# Patient Record
Sex: MALE | Race: OTHER | Employment: FULL TIME | URBAN - METROPOLITAN AREA
[De-identification: names, ages, dates, MRNs, and addresses within clinical notes are randomized per-mention and may not be internally consistent; named-entity substitution may affect disease eponyms.]

---

## 2023-03-10 PROBLEM — R74.8 ELEVATED LIVER ENZYMES: Status: ACTIVE | Noted: 2023-03-10

## 2023-03-11 PROBLEM — E87.3 METABOLIC ALKALOSIS: Status: ACTIVE | Noted: 2023-03-11

## 2023-03-11 PROBLEM — K85.90 ACUTE PANCREATITIS, UNSPECIFIED COMPLICATION STATUS, UNSPECIFIED PANCREATITIS TYPE (HCC): Status: ACTIVE | Noted: 2023-03-11

## 2023-03-11 PROBLEM — D72.829 LEUKOCYTOSIS, UNSPECIFIED TYPE: Status: ACTIVE | Noted: 2023-03-11

## 2023-03-11 PROBLEM — K85.90 ACUTE PANCREATITIS, UNSPECIFIED COMPLICATION STATUS, UNSPECIFIED PANCREATITIS TYPE: Status: ACTIVE | Noted: 2023-03-11

## 2023-03-11 NOTE — PLAN OF CARE
NURSING DISCHARGE NOTE    Discharged Home via Wheelchair. Accompanied by Family member  Belongings Taken by patient/family. Tolerated diet s/p lap rell. Lap sites x4 approximated. Scant amt blood from umbilical incision when pt was up to the bathroom. Gauze/tape applied, but incision otherwise WNL. Prescription for Norco given to pt's wife.

## 2023-03-11 NOTE — PLAN OF CARE
Assumed care of this pt upon admission. Pt alert oriented on room air, vss, pt up ad delores. At this time pt denies any pain, oriented to room and plan of care.  No questions at this time, will continue to follow  Problem: PAIN - ADULT  Goal: Verbalizes/displays adequate comfort level or patient's stated pain goal  Description: INTERVENTIONS:  - Encourage pt to monitor pain and request assistance  - Assess pain using appropriate pain scale  - Administer analgesics based on type and severity of pain and evaluate response  - Implement non-pharmacological measures as appropriate and evaluate response  - Consider cultural and social influences on pain and pain management  - Manage/alleviate anxiety  - Utilize distraction and/or relaxation techniques  - Monitor for opioid side effects  - Notify MD/LIP if interventions unsuccessful or patient reports new pain  - Anticipate increased pain with activity and pre-medicate as appropriate  Outcome: Progressing     Problem: GASTROINTESTINAL - ADULT  Goal: Minimal or absence of nausea and vomiting  Description: INTERVENTIONS:  - Maintain adequate hydration with IV or PO as ordered and tolerated  - Nasogastric tube to low intermittent suction as ordered  - Evaluate effectiveness of ordered antiemetic medications  - Provide nonpharmacologic comfort measures as appropriate  - Advance diet as tolerated, if ordered  - Obtain nutritional consult as needed  - Evaluate fluid balance  Outcome: Progressing

## 2023-03-11 NOTE — OCCUPATIONAL THERAPY NOTE
Orders received charts reviewed. Pt currently off the floor for lap rell. OT will follow up as able and appropriate.

## 2023-03-11 NOTE — ANESTHESIA PROCEDURE NOTES
Airway  Date/Time: 3/11/2023 12:56 PM  Urgency: elective      General Information and Staff    Patient location during procedure: OR  Anesthesiologist: Ale Menjivar MD  Performed: anesthesiologist   Performed by: Ale Menjivar MD  Authorized by: Ale Menjivar MD      Indications and Patient Condition  Indications for airway management: anesthesia  Spontaneous Ventilation: absent  Sedation level: deep  Preoxygenated: yes  Patient position: sniffing  Mask difficulty assessment: 1 - vent by mask    Final Airway Details  Final airway type: endotracheal airway      Successful airway: ETT  Cuffed: yes   Successful intubation technique: Video laryngoscopy  Endotracheal tube insertion site: oral  Blade: GlideScope  Blade size: #3  ETT size (mm): 7.0    Cormack-Lehane Classification: grade I - full view of glottis  Placement verified by: chest auscultation and capnometry   Cuff volume (mL): 6  Measured from: lips  ETT to lips (cm): 22  Number of attempts at approach: 1    Additional Comments  VC clean, passed smoothly, atraumatically x1 attempt. OGT and soft bite block to molars.

## 2023-03-11 NOTE — PHYSICAL THERAPY NOTE
PT order received and chart reviewed. Spoke with RN. Pt currently in pre-op holding for lap rell. Plan to reattempt on later date as appropriate and schedule allows.

## 2023-03-11 NOTE — OPERATIVE REPORT
[facility]  Op Note    Leslie Garcia Location: OR   [facility] [id number] MRN [id number]   Admission Date 3/10/2023 Operation Date 3/11/2023   Attending Physician Stacie Gaitan MD Operating Physician Ayana Thorpe MD   DATE OF OPERATION:  3/11/2023   PREOPERATIVE DIAGNOSIS: Acute cholecystitis with cholelithiasis  POSTOPERATIVE DIAGNOSIS: Acute cholecystitis with cholelithiasis  PROCEDURE PERFORMED: Laparoscopic cholecystectomy with intraoperative cholangiogram.   SURGEON:  Ayana Thorpe MD  ASSISTANT: Marguerite Vargas PA-C (Her assistance was essential to the performance and conduct of this case, especially in the performance of the cholangiogram and the careful dissection needed in and around the triangle of Calot and gallbladder hilum.)  ANESTHESIA: General.   SPECIMEN: Gallbladder to pathology. BLOOD LOSS:  5 cc   COMPLICATIONS: None. INDICATIONS FOR PROCEDURE: The patient is a 40-year-old gentleman who presented to the emergency room with epigastric and right upper quadrant abdominal pain that radiated throughout his abdomen. CT scan revealed gallstones. He was offered laparoscopic cholecystectomy the intraoperative cholangiogram.  The risks, benefits, and alternatives were discussed in detail with the patient. Risks include but are not limited to bleeding, wound infection, right shoulder pain, injury to common bile duct, injury to the liver and injury to other intraabdominal contents. The patient was agreeable to proceed with the operation. FINDINGS: Cholangiography was normal, with good flow in the duodenum and no filling defects toward the liver. The gallbladder was somewhat inflamed. There was a large stone impacted within the neck. OPERATIVE TECHNIQUE: After informed consent was obtained, the patient was taken to the operating room and placed in the supine position. General anesthesia was induced. The abdomen was then prepped and draped in the usual sterile fashion.  The umbilicus was inverted, and a curvilinear incision was made superior to it with an 11-blade scalpel. The Veress needle was passed into the abdomen, and pneumoperitoneum was instituted without difficulty. The 11 mm trocar was passed through this incision site. The abdomen was inspected and found to be grossly normal. Under direct vision, a 5 mm subxiphoid port and two right-sided lateral 5 mm ports were placed. The patient was placed head-up, right side up. The gallbladder was grasped and retracted cranially. Dissection commenced around the cystic duct and cystic artery, which were isolated. Dissection was kept above the line of Rouviere. The critical view was obtained, demonstrating 2 structures directly entering the gallbladder. One clip was placed towards the gallbladder on the cystic duct, and the duct was partially transected. The cystic duct was cannulated. A cholangiogram was obtained showing good flow in the duodenum with no filling defects and good flow towards the liver. The cholangiocath was withdrawn, and three clips were placed opposite the partial transection. The duct was then completely transected. The cystic artery was isolated, one clip was placed towards the gallbladder, two away, and the artery was transected. The gallbladder was then dissected free from the liver bed using cautery. The gallbladder was then withdrawn through the umbilical port site. Inspection of the liver bed revealed some minor bleeding that was easily controlled with cautery. The abdomen was copiously irrigated with normal saline. Once hemostasis was assured and the irrigant returned as clear and colorless, the right-sided ports were withdrawn under direct vision. The pneumoperitoneum was aspirated, and the remaining ports were withdrawn. The fascia at the umbilical port site was reapproximated with Maxon suture, and the skin at all four incisions was reapproximated with subcuticular Vicryl suture.  0.5% Marcaine with epinephrine was injected around the incisions to help with postoperative pain control. Steri-Strips and Mastisol were placed across the incisions. The patient tolerated the procedure well, was extubated in the OR, and went to the PACU in good condition.     Napoleon Hernandez MD

## 2023-03-11 NOTE — ED INITIAL ASSESSMENT (HPI)
Pt started having lower abdominal pain that wraps around his side into his lower R back. It has lessened in intensity and sharpness but is still there. RUQ and LUQ discomfort as well. Seen in UC sent here.

## 2023-04-27 ENCOUNTER — LAB ENCOUNTER (OUTPATIENT)
Dept: LAB | Age: 48
End: 2023-04-27
Attending: FAMILY MEDICINE
Payer: COMMERCIAL

## 2023-04-27 DIAGNOSIS — Z00.00 ROUTINE GENERAL MEDICAL EXAMINATION AT A HEALTH CARE FACILITY: Primary | ICD-10-CM

## 2023-04-27 LAB
ALBUMIN SERPL-MCNC: 3.9 G/DL (ref 3.4–5)
ALBUMIN/GLOB SERPL: 1 {RATIO} (ref 1–2)
ALP LIVER SERPL-CCNC: 93 U/L
ALT SERPL-CCNC: 46 U/L
ANION GAP SERPL CALC-SCNC: 6 MMOL/L (ref 0–18)
AST SERPL-CCNC: 24 U/L (ref 15–37)
BASOPHILS # BLD AUTO: 0.07 X10(3) UL (ref 0–0.2)
BASOPHILS NFR BLD AUTO: 1 %
BILIRUB SERPL-MCNC: 1.7 MG/DL (ref 0.1–2)
BILIRUB UR QL STRIP.AUTO: NEGATIVE
BUN BLD-MCNC: 11 MG/DL (ref 7–18)
CALCIUM BLD-MCNC: 9.2 MG/DL (ref 8.5–10.1)
CHLORIDE SERPL-SCNC: 105 MMOL/L (ref 98–112)
CHOLEST SERPL-MCNC: 202 MG/DL (ref ?–200)
CLARITY UR REFRACT.AUTO: CLEAR
CO2 SERPL-SCNC: 28 MMOL/L (ref 21–32)
COLOR UR AUTO: YELLOW
COMPLEXED PSA SERPL-MCNC: 0.65 NG/ML (ref ?–4)
CREAT BLD-MCNC: 1.08 MG/DL
EOSINOPHIL # BLD AUTO: 0.1 X10(3) UL (ref 0–0.7)
EOSINOPHIL NFR BLD AUTO: 1.4 %
ERYTHROCYTE [DISTWIDTH] IN BLOOD BY AUTOMATED COUNT: 11.9 %
EST. AVERAGE GLUCOSE BLD GHB EST-MCNC: 114 MG/DL (ref 68–126)
FASTING PATIENT LIPID ANSWER: NO
FASTING STATUS PATIENT QL REPORTED: NO
GFR SERPLBLD BASED ON 1.73 SQ M-ARVRAT: 84 ML/MIN/1.73M2 (ref 60–?)
GLOBULIN PLAS-MCNC: 3.8 G/DL (ref 2.8–4.4)
GLUCOSE BLD-MCNC: 152 MG/DL (ref 70–99)
GLUCOSE UR STRIP.AUTO-MCNC: NEGATIVE MG/DL
HBA1C MFR BLD: 5.6 % (ref ?–5.7)
HCT VFR BLD AUTO: 42.8 %
HCV AB SERPL QL IA: NONREACTIVE
HDLC SERPL-MCNC: 42 MG/DL (ref 40–59)
HGB BLD-MCNC: 14.6 G/DL
IMM GRANULOCYTES # BLD AUTO: 0.02 X10(3) UL (ref 0–1)
IMM GRANULOCYTES NFR BLD: 0.3 %
KETONES UR STRIP.AUTO-MCNC: NEGATIVE MG/DL
LDLC SERPL CALC-MCNC: 125 MG/DL (ref ?–100)
LEUKOCYTE ESTERASE UR QL STRIP.AUTO: NEGATIVE
LYMPHOCYTES # BLD AUTO: 1.85 X10(3) UL (ref 1–4)
LYMPHOCYTES NFR BLD AUTO: 25.3 %
MCH RBC QN AUTO: 31.5 PG (ref 26–34)
MCHC RBC AUTO-ENTMCNC: 34.1 G/DL (ref 31–37)
MCV RBC AUTO: 92.4 FL
MONOCYTES # BLD AUTO: 0.42 X10(3) UL (ref 0.1–1)
MONOCYTES NFR BLD AUTO: 5.7 %
NEUTROPHILS # BLD AUTO: 4.86 X10 (3) UL (ref 1.5–7.7)
NEUTROPHILS # BLD AUTO: 4.86 X10(3) UL (ref 1.5–7.7)
NEUTROPHILS NFR BLD AUTO: 66.3 %
NITRITE UR QL STRIP.AUTO: NEGATIVE
NONHDLC SERPL-MCNC: 160 MG/DL (ref ?–130)
OSMOLALITY SERPL CALC.SUM OF ELEC: 290 MOSM/KG (ref 275–295)
PH UR STRIP.AUTO: 5 [PH] (ref 5–8)
PLATELET # BLD AUTO: 259 10(3)UL (ref 150–450)
POTASSIUM SERPL-SCNC: 3.9 MMOL/L (ref 3.5–5.1)
PROT SERPL-MCNC: 7.7 G/DL (ref 6.4–8.2)
PROT UR STRIP.AUTO-MCNC: NEGATIVE MG/DL
RBC # BLD AUTO: 4.63 X10(6)UL
SODIUM SERPL-SCNC: 139 MMOL/L (ref 136–145)
SP GR UR STRIP.AUTO: 1.03 (ref 1–1.03)
TRIGL SERPL-MCNC: 199 MG/DL (ref 30–149)
TSI SER-ACNC: 0.79 MIU/ML (ref 0.36–3.74)
UROBILINOGEN UR STRIP.AUTO-MCNC: <2 MG/DL
VLDLC SERPL CALC-MCNC: 36 MG/DL (ref 0–30)
WBC # BLD AUTO: 7.3 X10(3) UL (ref 4–11)

## 2023-04-27 PROCEDURE — 86803 HEPATITIS C AB TEST: CPT

## 2023-04-27 PROCEDURE — 86702 HIV-2 ANTIBODY: CPT

## 2023-04-27 PROCEDURE — 80061 LIPID PANEL: CPT

## 2023-04-27 PROCEDURE — 85025 COMPLETE CBC W/AUTO DIFF WBC: CPT

## 2023-04-27 PROCEDURE — 84443 ASSAY THYROID STIM HORMONE: CPT

## 2023-04-27 PROCEDURE — 86701 HIV-1ANTIBODY: CPT

## 2023-04-27 PROCEDURE — 83036 HEMOGLOBIN GLYCOSYLATED A1C: CPT

## 2023-04-27 PROCEDURE — 80053 COMPREHEN METABOLIC PANEL: CPT

## 2023-04-27 PROCEDURE — 81001 URINALYSIS AUTO W/SCOPE: CPT

## 2023-04-27 PROCEDURE — 36415 COLL VENOUS BLD VENIPUNCTURE: CPT

## 2023-05-03 LAB
HIV 1/2 INTERP: NEGATIVE
HIV-1 AB: NON REACTIVE
HIV-2 AB: NON REACTIVE

## 2024-02-13 ENCOUNTER — OFFICE VISIT (OUTPATIENT)
Dept: FAMILY MEDICINE CLINIC | Facility: CLINIC | Age: 49
End: 2024-02-13
Payer: COMMERCIAL

## 2024-02-13 VITALS
OXYGEN SATURATION: 95 % | DIASTOLIC BLOOD PRESSURE: 78 MMHG | HEIGHT: 71 IN | BODY MASS INDEX: 28 KG/M2 | HEART RATE: 68 BPM | WEIGHT: 200 LBS | SYSTOLIC BLOOD PRESSURE: 112 MMHG

## 2024-02-13 DIAGNOSIS — Z00.00 LABORATORY EXAMINATION ORDERED AS PART OF A COMPLETE PHYSICAL EXAMINATION: ICD-10-CM

## 2024-02-13 DIAGNOSIS — E55.9 VITAMIN D DEFICIENCY: ICD-10-CM

## 2024-02-13 DIAGNOSIS — R10.30 LOWER ABDOMINAL PAIN: Primary | ICD-10-CM

## 2024-02-13 PROCEDURE — 99204 OFFICE O/P NEW MOD 45 MIN: CPT | Performed by: FAMILY MEDICINE

## 2024-02-13 PROCEDURE — 3008F BODY MASS INDEX DOCD: CPT | Performed by: FAMILY MEDICINE

## 2024-02-13 PROCEDURE — 3074F SYST BP LT 130 MM HG: CPT | Performed by: FAMILY MEDICINE

## 2024-02-13 PROCEDURE — 3078F DIAST BP <80 MM HG: CPT | Performed by: FAMILY MEDICINE

## 2024-02-21 ENCOUNTER — LAB ENCOUNTER (OUTPATIENT)
Dept: LAB | Age: 49
End: 2024-02-21
Attending: FAMILY MEDICINE
Payer: COMMERCIAL

## 2024-02-21 DIAGNOSIS — R73.09 ELEVATED GLUCOSE: ICD-10-CM

## 2024-02-21 DIAGNOSIS — Z00.00 LABORATORY EXAMINATION ORDERED AS PART OF A COMPLETE PHYSICAL EXAMINATION: ICD-10-CM

## 2024-02-21 DIAGNOSIS — E55.9 VITAMIN D DEFICIENCY: ICD-10-CM

## 2024-02-21 DIAGNOSIS — R10.30 LOWER ABDOMINAL PAIN: ICD-10-CM

## 2024-02-21 LAB
ALBUMIN SERPL-MCNC: 3.8 G/DL (ref 3.4–5)
ALBUMIN/GLOB SERPL: 1.1 {RATIO} (ref 1–2)
ALP LIVER SERPL-CCNC: 98 U/L
ALT SERPL-CCNC: 44 U/L
ANION GAP SERPL CALC-SCNC: 6 MMOL/L (ref 0–18)
AST SERPL-CCNC: 27 U/L (ref 15–37)
BASOPHILS # BLD AUTO: 0.04 X10(3) UL (ref 0–0.2)
BASOPHILS NFR BLD AUTO: 0.5 %
BILIRUB SERPL-MCNC: 1.2 MG/DL (ref 0.1–2)
BUN BLD-MCNC: 12 MG/DL (ref 9–23)
CALCIUM BLD-MCNC: 9.3 MG/DL (ref 8.5–10.1)
CHLORIDE SERPL-SCNC: 106 MMOL/L (ref 98–112)
CHOLEST SERPL-MCNC: 213 MG/DL (ref ?–200)
CO2 SERPL-SCNC: 27 MMOL/L (ref 21–32)
COMPLEXED PSA SERPL-MCNC: 0.58 NG/ML (ref ?–4)
CREAT BLD-MCNC: 0.97 MG/DL
EGFRCR SERPLBLD CKD-EPI 2021: 95 ML/MIN/1.73M2 (ref 60–?)
EOSINOPHIL # BLD AUTO: 0.09 X10(3) UL (ref 0–0.7)
EOSINOPHIL NFR BLD AUTO: 1.1 %
ERYTHROCYTE [DISTWIDTH] IN BLOOD BY AUTOMATED COUNT: 11.9 %
FASTING PATIENT LIPID ANSWER: YES
FASTING STATUS PATIENT QL REPORTED: YES
GLOBULIN PLAS-MCNC: 3.5 G/DL (ref 2.8–4.4)
GLUCOSE BLD-MCNC: 101 MG/DL (ref 70–99)
HCT VFR BLD AUTO: 44 %
HDLC SERPL-MCNC: 39 MG/DL (ref 40–59)
HGB BLD-MCNC: 15 G/DL
IMM GRANULOCYTES # BLD AUTO: 0.02 X10(3) UL (ref 0–1)
IMM GRANULOCYTES NFR BLD: 0.2 %
LDLC SERPL CALC-MCNC: 115 MG/DL (ref ?–100)
LIPASE SERPL-CCNC: 47 U/L (ref ?–300)
LYMPHOCYTES # BLD AUTO: 1.24 X10(3) UL (ref 1–4)
LYMPHOCYTES NFR BLD AUTO: 14.9 %
MCH RBC QN AUTO: 31.7 PG (ref 26–34)
MCHC RBC AUTO-ENTMCNC: 34.1 G/DL (ref 31–37)
MCV RBC AUTO: 93 FL
MONOCYTES # BLD AUTO: 0.66 X10(3) UL (ref 0.1–1)
MONOCYTES NFR BLD AUTO: 7.9 %
NEUTROPHILS # BLD AUTO: 6.3 X10 (3) UL (ref 1.5–7.7)
NEUTROPHILS # BLD AUTO: 6.3 X10(3) UL (ref 1.5–7.7)
NEUTROPHILS NFR BLD AUTO: 75.4 %
NONHDLC SERPL-MCNC: 174 MG/DL (ref ?–130)
OSMOLALITY SERPL CALC.SUM OF ELEC: 288 MOSM/KG (ref 275–295)
PLATELET # BLD AUTO: 230 10(3)UL (ref 150–450)
POTASSIUM SERPL-SCNC: 4.1 MMOL/L (ref 3.5–5.1)
PROT SERPL-MCNC: 7.3 G/DL (ref 6.4–8.2)
RBC # BLD AUTO: 4.73 X10(6)UL
SODIUM SERPL-SCNC: 139 MMOL/L (ref 136–145)
TRIGL SERPL-MCNC: 340 MG/DL (ref 30–149)
TSI SER-ACNC: 1.22 MIU/ML (ref 0.36–3.74)
VIT D+METAB SERPL-MCNC: 29.9 NG/ML (ref 30–100)
VLDLC SERPL CALC-MCNC: 60 MG/DL (ref 0–30)
WBC # BLD AUTO: 8.4 X10(3) UL (ref 4–11)

## 2024-02-21 PROCEDURE — 84153 ASSAY OF PSA TOTAL: CPT | Performed by: FAMILY MEDICINE

## 2024-02-21 PROCEDURE — 83690 ASSAY OF LIPASE: CPT | Performed by: FAMILY MEDICINE

## 2024-02-21 PROCEDURE — 82306 VITAMIN D 25 HYDROXY: CPT | Performed by: FAMILY MEDICINE

## 2024-02-21 PROCEDURE — 80050 GENERAL HEALTH PANEL: CPT | Performed by: FAMILY MEDICINE

## 2024-02-21 PROCEDURE — 80061 LIPID PANEL: CPT | Performed by: FAMILY MEDICINE

## 2024-02-21 PROCEDURE — 83036 HEMOGLOBIN GLYCOSYLATED A1C: CPT | Performed by: FAMILY MEDICINE

## 2024-02-22 LAB
EST. AVERAGE GLUCOSE BLD GHB EST-MCNC: 123 MG/DL (ref 68–126)
HBA1C MFR BLD: 5.9 % (ref ?–5.7)

## 2024-02-22 NOTE — PROGRESS NOTES
Subjective:   Patient ID: Cleveland Mcmullen is a 48 year old male.    1. Lower abdominal pain.  Occasional nausea.  Ongoing for a few months.  No F/C.  No diarrhea/constipation.  No black or bloody stools.    + vit D def.  Not taking vit D.  No fractures.  Mild fatigue.            History/Other:   Review of Systems   All other systems reviewed and are negative.    No current outpatient medications on file.     Allergies:No Known Allergies    Objective:   Physical Exam  Vitals reviewed.   Constitutional:       General: He is not in acute distress.     Appearance: He is well-developed. He is not diaphoretic.   Eyes:      General: No scleral icterus.        Right eye: No discharge.         Left eye: No discharge.      Conjunctiva/sclera: Conjunctivae normal.   Cardiovascular:      Rate and Rhythm: Normal rate and regular rhythm.      Heart sounds: Normal heart sounds.   Pulmonary:      Effort: Pulmonary effort is normal. No respiratory distress.      Breath sounds: Normal breath sounds. No wheezing or rales.   Abdominal:      Comments: + lower abd tenderness.       Assessment & Plan:   1. Lower abdominal pain    2. Laboratory examination ordered as part of a complete physical examination    3. Vitamin D deficiency      1. Lower abdominal pain  - Lipase [E]; Future  - CT ABDOMEN+PELVIS(CONTRAST ONLY)(CPT=74177); Future    2. Laboratory examination ordered as part of a complete physical examination  - Comp Metabolic Panel (14) [E]; Future  - Lipid Panel [E]; Future  - CBC W Differential W Platelet [E]; Future  - TSH W Reflex To Free T4 [E]; Future  - PSA, Total (Screening) [E]; Future  - Vitamin D [E]; Future    3. Vitamin D deficiency  - Vitamin D [E]; Future      Orders Placed This Encounter   Procedures    Comp Metabolic Panel (14) [E]    Lipase [E]    Lipid Panel [E]    CBC W Differential W Platelet [E]    TSH W Reflex To Free T4 [E]    PSA, Total (Screening) [E]    Vitamin D [E]       Meds This  Visit:  Requested Prescriptions      No prescriptions requested or ordered in this encounter       Imaging & Referrals:  CT ABDOMEN+PELVIS(CONTRAST ONLY)(CPT=74177)

## 2024-08-23 ENCOUNTER — OFFICE VISIT (OUTPATIENT)
Dept: FAMILY MEDICINE CLINIC | Facility: CLINIC | Age: 49
End: 2024-08-23
Payer: COMMERCIAL

## 2024-08-23 VITALS
BODY MASS INDEX: 26.85 KG/M2 | RESPIRATION RATE: 18 BRPM | WEIGHT: 191.81 LBS | OXYGEN SATURATION: 98 % | HEIGHT: 71 IN | SYSTOLIC BLOOD PRESSURE: 108 MMHG | HEART RATE: 61 BPM | DIASTOLIC BLOOD PRESSURE: 70 MMHG

## 2024-08-23 DIAGNOSIS — E55.9 VITAMIN D DEFICIENCY: ICD-10-CM

## 2024-08-23 DIAGNOSIS — Z23 NEED FOR TDAP VACCINATION: ICD-10-CM

## 2024-08-23 DIAGNOSIS — R73.03 PREDIABETES: ICD-10-CM

## 2024-08-23 DIAGNOSIS — E78.1 HIGH TRIGLYCERIDES: ICD-10-CM

## 2024-08-23 DIAGNOSIS — Z00.00 ANNUAL PHYSICAL EXAM: Primary | ICD-10-CM

## 2024-08-23 PROCEDURE — 99396 PREV VISIT EST AGE 40-64: CPT | Performed by: FAMILY MEDICINE

## 2024-08-23 PROCEDURE — 90471 IMMUNIZATION ADMIN: CPT | Performed by: FAMILY MEDICINE

## 2024-08-23 PROCEDURE — 90715 TDAP VACCINE 7 YRS/> IM: CPT | Performed by: FAMILY MEDICINE

## 2024-08-23 PROCEDURE — 3078F DIAST BP <80 MM HG: CPT | Performed by: FAMILY MEDICINE

## 2024-08-23 PROCEDURE — 3008F BODY MASS INDEX DOCD: CPT | Performed by: FAMILY MEDICINE

## 2024-08-23 PROCEDURE — 3074F SYST BP LT 130 MM HG: CPT | Performed by: FAMILY MEDICINE

## 2024-08-23 RX ORDER — CALCIUM CARBONATE/VITAMIN D3 600 MG-10
1 TABLET ORAL DAILY
COMMUNITY
Start: 2024-08-23

## 2024-08-23 NOTE — PATIENT INSTRUCTIONS
Get shingles vaccine after 1 month.  Its 2 shots spaced out by 2-6 months    Do over the counter probiotics like florastor x 1 month.  If loose stools not better after 3-4 weeks lets do a few days of antibiotics.

## 2024-08-26 NOTE — H&P
Cleveland Mcmullen is a 48 year old male who presents for a complete physical exam.   HPI:   Pt complains of prediabetes.  Last A1c value was 5.9% done 2/21/2024.   + high triglycerides.      + vit D def.  Not taking vit D.  No fractures.  Mild fatigue.    Colonoscopy: due    Current Outpatient Medications   Medication Sig Dispense Refill    Omega 3 1200 MG Oral Cap Take 1 tablet by mouth daily.        History reviewed. No pertinent past medical history.   Past Surgical History:   Procedure Laterality Date    Laparoscopic cholecystectomy  03/11/2023      History reviewed. No pertinent family history.   Social History:  Social History     Socioeconomic History    Marital status:    Tobacco Use    Smoking status: Never     Passive exposure: Never    Smokeless tobacco: Never   Vaping Use    Vaping status: Never Used   Substance and Sexual Activity    Alcohol use: Never    Drug use: Never     Social Determinants of Health      Received from Darby Smart, Darby Smart    [facility]         REVIEW OF SYSTEMS:   GENERAL: feels well otherwise  SKIN: denies any unusual skin lesions or rash  EYES:denies blurred vision or double vision  HEENT: denies nasal congestion, sinus pain or ST, denies decreased hearing  LUNGS: denies shortness of breath or frequent cough  CV: denies chest pain, pressure or palpitations  GI: denies abdominal pain, heartburn, chronic diarrhea or constipation  : denies nocturia or changes in stream, denies erectile dysfunction  MS: denies back pain, myalgias or arthralgias  NEURO: denies headaches or dizziness  PSYCH: denies depression or anxiety  HEMATOLOGIC: denies bruising or bleeding easily  ENDOCRINE: denies frequent thirst or urination, denies unintentional weight gain/loss    EXAM:   /70   Pulse 61   Resp 18   Ht 5' 11\" (1.803 m)   Wt 191 lb 12.8 oz (87 kg)   SpO2 98%   BMI 26.75 kg/m²       Body mass index is 26.75 kg/m².     GENERAL: well developed, well  nourished,in no apparent distress  SKIN: no rashes,no suspicious lesions  HEENT: atraumatic, normocephalic,TMs clear, posterior pharynx clear  EYES: PERRLA,conjunctiva clear  NECK: supple,no thyromegaly, no adenopathy  LUNGS: clear to auscultation, easy breathing, no cough  CV: S1 S2 noted, RRR, no murmur  GI: active bowel sounds, no rebound/rigidity/tenderness, no HSM  : 2 descended testes, no scrotal mass or tenderness, normal penis no lesions, no hernia  RECTAL: good rectal tone, no masses; prostate smooth, non-tender, non-enlarged, no nodules  MS: Collette, good tone & strength  EXT: no cyanosis, clubbing or edema  NEURO: Alert & oriented x 3, LEs +2DTRs  PSYCH: Mood and affect appropriate    ASSESSMENT AND PLAN:   Cleveland Mcmullen is a 48 year old male who presents for a complete physical exam. Heart healthy diet, routine exercise, and annual flu vaccines encouraged.  The patient indicates understanding of these issues and agrees to the plan.  Orders Placed This Encounter   Procedures    Lipid Panel     Standing Status:   Future     Standing Expiration Date:   8/23/2025    Hemoglobin A1C     Standing Status:   Future     Standing Expiration Date:   8/23/2025    Vitamin D [E]     Standing Status:   Future     Standing Expiration Date:   8/23/2025     Order Specific Question:   Please pick the scenario that best fits the purpose for ordering this test     Answer:   General Screening/Vit D deficiency (25-Hydroxy)     Order Specific Question:   Release to patient     Answer:   Immediate    TdaP (Adacel, Boostrix) [90732]     Follow up in 1 year.    1. Annual physical exam  - Lipid Panel; Future  - Hemoglobin A1C; Future  - Vitamin D [E]; Future    2. High triglycerides  - Omega 3 1200 MG Oral Cap; Take 1 tablet by mouth daily.  - Lipid Panel; Future    3. Prediabetes  - Hemoglobin A1C; Future    4. Vitamin D deficiency  - Vitamin D [E]; Future    5. Need for Tdap vaccination  - TdaP (Adacel, Boostrix)  [96046]

## 2024-09-06 ENCOUNTER — LAB ENCOUNTER (OUTPATIENT)
Dept: LAB | Age: 49
End: 2024-09-06
Attending: FAMILY MEDICINE
Payer: COMMERCIAL

## 2024-09-06 DIAGNOSIS — R73.03 PREDIABETES: ICD-10-CM

## 2024-09-06 DIAGNOSIS — Z00.00 ANNUAL PHYSICAL EXAM: ICD-10-CM

## 2024-09-06 DIAGNOSIS — E55.9 VITAMIN D DEFICIENCY: ICD-10-CM

## 2024-09-06 DIAGNOSIS — E78.1 HIGH TRIGLYCERIDES: ICD-10-CM

## 2024-09-06 LAB
CHOLEST SERPL-MCNC: 207 MG/DL (ref ?–200)
EST. AVERAGE GLUCOSE BLD GHB EST-MCNC: 114 MG/DL (ref 68–126)
FASTING PATIENT LIPID ANSWER: YES
HBA1C MFR BLD: 5.6 % (ref ?–5.7)
HDLC SERPL-MCNC: 39 MG/DL (ref 40–59)
LDLC SERPL CALC-MCNC: 133 MG/DL (ref ?–100)
NONHDLC SERPL-MCNC: 168 MG/DL (ref ?–130)
TRIGL SERPL-MCNC: 197 MG/DL (ref 30–149)
VIT D+METAB SERPL-MCNC: 26.1 NG/ML (ref 30–100)
VLDLC SERPL CALC-MCNC: 36 MG/DL (ref 0–30)

## 2024-09-06 PROCEDURE — 83036 HEMOGLOBIN GLYCOSYLATED A1C: CPT

## 2024-09-06 PROCEDURE — 36415 COLL VENOUS BLD VENIPUNCTURE: CPT

## 2024-09-06 PROCEDURE — 82306 VITAMIN D 25 HYDROXY: CPT

## 2024-09-06 PROCEDURE — 80061 LIPID PANEL: CPT

## 2024-09-13 ENCOUNTER — PATIENT MESSAGE (OUTPATIENT)
Dept: FAMILY MEDICINE CLINIC | Facility: CLINIC | Age: 49
End: 2024-09-13

## 2024-09-13 DIAGNOSIS — E78.2 MIXED HYPERLIPIDEMIA: Primary | ICD-10-CM

## 2024-09-13 NOTE — TELEPHONE ENCOUNTER
Called and spoke with pt.  He will check neck regularly.  Not feeling any bumps.  Wants to wait on CT neck.

## 2024-09-13 NOTE — TELEPHONE ENCOUNTER
From: Cleveland Mcmullen  To: Marnie King  Sent: 9/13/2024 9:20 AM CDT  Subject: Neck finding    Thank you for your help Dr. King, I just want to be sure, you told me that you find a lump or something similar in my neck, I didn’t see any test for that. Is that ok?

## 2025-06-09 ENCOUNTER — OFFICE VISIT (OUTPATIENT)
Dept: FAMILY MEDICINE CLINIC | Facility: CLINIC | Age: 50
End: 2025-06-09
Payer: COMMERCIAL

## 2025-06-09 VITALS
DIASTOLIC BLOOD PRESSURE: 62 MMHG | HEIGHT: 71 IN | RESPIRATION RATE: 15 BRPM | HEART RATE: 64 BPM | WEIGHT: 196 LBS | BODY MASS INDEX: 27.44 KG/M2 | OXYGEN SATURATION: 99 % | SYSTOLIC BLOOD PRESSURE: 102 MMHG

## 2025-06-09 DIAGNOSIS — E55.9 VITAMIN D DEFICIENCY: ICD-10-CM

## 2025-06-09 DIAGNOSIS — Z00.00 ANNUAL PHYSICAL EXAM: Primary | ICD-10-CM

## 2025-06-09 DIAGNOSIS — R53.83 FATIGUE, UNSPECIFIED TYPE: ICD-10-CM

## 2025-06-09 DIAGNOSIS — Z23 NEED FOR SHINGLES VACCINE: ICD-10-CM

## 2025-06-09 DIAGNOSIS — R19.6 BREATH, FOUL: ICD-10-CM

## 2025-06-09 NOTE — H&P
The 21st Century Cures Act makes medical notes like these available to patients in the interest of transparency. Please be advised this is a medical document. Medical documents are intended to carry relevant information, facts as evident, and the clinical opinion of the practitioner. The medical note is intended as peer to peer communication and may appear blunt or direct. It is written in medical language and may contain abbreviations or verbiage that are unfamiliar.   Cleveland Mcmullen is a 49 year old male who presents for a complete physical exam.     HPI:   Pt complains of bad breath, dry mouth, not drinking enough amount of water. Sometimes feel dizzy.  No CP/SOB.    +Acid reflux, when he takes dinner late.    + vit D def.  Took prescribed vit D, not taking over the counter vit D, stopped from 2 months.  No fractures.  Mild fatigue.    High triglyceride, taking omega 3.      Colonoscopy: -    Current Medications[1]   Past Medical History[2]   Past Surgical History[3]   Family History[4]   Social History:  Short Social Hx on File[5]      REVIEW OF SYSTEMS:   GENERAL: feels well otherwise  SKIN: denies any unusual skin lesions or rash  EYES:denies blurred vision or double vision  HEENT: denies nasal congestion, sinus pain or ST, denies decreased hearing  LUNGS: denies shortness of breath or frequent cough  CV: denies chest pain, pressure or palpitations  GI: denies abdominal pain, heartburn, chronic diarrhea or constipation  : denies nocturia or changes in stream, denies erectile dysfunction  MS: denies back pain, myalgias or arthralgias  NEURO: denies headaches or dizziness  PSYCH: denies depression or anxiety  HEMATOLOGIC: denies bruising or bleeding easily  ENDOCRINE: denies frequent thirst or urination, denies unintentional weight gain/loss    EXAM:   /62   Pulse 64   Resp 15   Ht 5' 11\" (1.803 m)   Wt 196 lb   SpO2 99%   BMI 27.34 kg/m²       Body mass index is 27.34 kg/m².      GENERAL: well developed, well nourished,in no apparent distress  SKIN: no rashes,no suspicious lesions  HEENT: atraumatic, normocephalic,TMs clear, posterior pharynx clear  EYES: PERRLA,conjunctiva clear  NECK: supple,no thyromegaly, no adenopathy  LUNGS: clear to auscultation, easy breathing, no cough  CV: S1 S2 noted, RRR, no murmur  GI: active bowel sounds, no rebound/rigidity/tenderness, no HSM  : 2 descended testes, no scrotal mass or tenderness, normal penis no lesions, no hernia  MS: Collette, good tone & strength  EXT: no cyanosis, clubbing or edema  NEURO: Alert & oriented x 3, LEs +2DTRs  PSYCH: Mood and affect appropriate    ASSESSMENT AND PLAN:   Cleveland Mcmullen is a 49 year old male who presents for a complete physical exam. Heart healthy diet, routine exercise, and annual flu vaccines encouraged.  The patient indicates understanding of these issues and agrees to the plan.  1. Annual physical exam  - Lipid Panel; Future  - CBC With Differential With Platelet; Future  - PSA Total, Screen; Future  - Comp Metabolic Panel (14); Future  - TSH W Reflex To Free T4; Future  - Vitamin D; Future    2. Vitamin D deficiency  - Vitamin D; Future    3. Breath, foul  - Advised to stay hydrated, drink 2 litters of water/day. If sweaty drink 2.5 litters of water.    4. Need for shingles vaccine  - Zoster Recombinant Adjuvanted (Shingrix -Shingles) [27103]  - Zoster Recombinant Adjuvanted (Shingrix -Shingles) [05077]; Future    5. Fatigue, unspecified type  - Testosterone, Total Free, Male [E]; Future    Orders Placed This Encounter   Procedures    Lipid Panel     Standing Status:   Future     Expected Date:   6/9/2025     Expiration Date:   6/9/2026    CBC With Differential With Platelet     Standing Status:   Future     Expected Date:   6/9/2025     Expiration Date:   6/9/2026    PSA Total, Screen     Standing Status:   Future     Expected Date:   6/9/2025     Expiration Date:   6/9/2026    Comp Metabolic  Panel (14)     Standing Status:   Future     Expected Date:   6/9/2025     Expiration Date:   6/9/2026    TSH W Reflex To Free T4     Standing Status:   Future     Expected Date:   6/9/2025     Expiration Date:   6/9/2026    Vitamin D     Standing Status:   Future     Expected Date:   6/9/2025     Expiration Date:   6/9/2026     Please pick the scenario that best fits the purpose for ordering this test:   General Screening/Vit D deficiency (25-Hydroxy)     Release to patient:   Immediate    Testosterone, Total Free, Male [E]     Standing Status:   Future     Expected Date:   6/9/2025     Expiration Date:   6/9/2026     Release to patient:   Immediate    Zoster Recombinant Adjuvanted (Shingrix -Shingles) [[id number]]    Zoster Recombinant Adjuvanted (Shingrix -Shingles) [[id number]]     Standing Status:   Future     Expected Date:   9/9/2025     Expiration Date:   6/9/2026     Follow up in 1 year.        [1]   Current Outpatient Medications   Medication Sig Dispense Refill    ergocalciferol 1.25 MG (32076 UT) Oral Cap Take 1 capsule (50,000 Units total) by mouth once a week. Once weekly x 20 weeks.  Take with food 20 capsule 0    Omega 3 1200 MG Oral Cap Take 1 tablet by mouth daily.     [2] History reviewed. No pertinent past medical history.  [3]   Past Surgical History:  Procedure Laterality Date    Laparoscopic cholecystectomy  03/11/2023   [4] History reviewed. No pertinent family history.  [5]   Social History  Socioeconomic History    Marital status:    Tobacco Use    Smoking status: Never     Passive exposure: Never    Smokeless tobacco: Never   Vaping Use    Vaping status: Never Used   Substance and Sexual Activity    Alcohol use: Never    Drug use: Never     Social Drivers of Health     Food Insecurity: No Food Insecurity (6/9/2025)    NCSS - Food Insecurity     Worried About Running Out of Food in the Last Year: No     Ran Out of Food in the Last Year: No   Transportation Needs: No Transportation Needs  (6/9/2025)    NCSS - Transportation     Lack of Transportation: No   Housing Stability: Not At Risk (6/9/2025)    NCSS - Housing/Utilities     Has Housing: Yes     Worried About Losing Housing: No     Unable to Get Utilities: No

## 2025-06-10 ENCOUNTER — LAB ENCOUNTER (OUTPATIENT)
Dept: LAB | Age: 50
End: 2025-06-10
Attending: FAMILY MEDICINE
Payer: COMMERCIAL

## 2025-06-10 DIAGNOSIS — E55.9 VITAMIN D DEFICIENCY: ICD-10-CM

## 2025-06-10 DIAGNOSIS — R53.83 FATIGUE, UNSPECIFIED TYPE: ICD-10-CM

## 2025-06-10 DIAGNOSIS — Z00.00 ANNUAL PHYSICAL EXAM: ICD-10-CM

## 2025-06-10 LAB
ALBUMIN SERPL-MCNC: 4.6 G/DL (ref 3.2–4.8)
ALBUMIN/GLOB SERPL: 1.6 {RATIO} (ref 1–2)
ALP LIVER SERPL-CCNC: 92 U/L (ref 45–117)
ALT SERPL-CCNC: 30 U/L (ref 10–49)
ANION GAP SERPL CALC-SCNC: 9 MMOL/L (ref 0–18)
AST SERPL-CCNC: 24 U/L (ref ?–34)
BASOPHILS # BLD AUTO: 0.04 X10(3) UL (ref 0–0.2)
BASOPHILS NFR BLD AUTO: 0.4 %
BILIRUB SERPL-MCNC: 2 MG/DL (ref 0.3–1.2)
BUN BLD-MCNC: 10 MG/DL (ref 9–23)
CALCIUM BLD-MCNC: 9.2 MG/DL (ref 8.7–10.6)
CHLORIDE SERPL-SCNC: 103 MMOL/L (ref 98–112)
CHOLEST SERPL-MCNC: 206 MG/DL (ref ?–200)
CO2 SERPL-SCNC: 28 MMOL/L (ref 21–32)
COMPLEXED PSA SERPL-MCNC: 0.44 NG/ML (ref ?–4)
CREAT BLD-MCNC: 1.07 MG/DL (ref 0.7–1.3)
EGFRCR SERPLBLD CKD-EPI 2021: 84 ML/MIN/1.73M2 (ref 60–?)
EOSINOPHIL # BLD AUTO: 0.06 X10(3) UL (ref 0–0.7)
EOSINOPHIL NFR BLD AUTO: 0.6 %
ERYTHROCYTE [DISTWIDTH] IN BLOOD BY AUTOMATED COUNT: 11.8 %
FASTING PATIENT LIPID ANSWER: YES
FASTING STATUS PATIENT QL REPORTED: YES
GLOBULIN PLAS-MCNC: 2.9 G/DL (ref 2–3.5)
GLUCOSE BLD-MCNC: 90 MG/DL (ref 70–99)
HCT VFR BLD AUTO: 42.2 % (ref 39–53)
HDLC SERPL-MCNC: 42 MG/DL (ref 40–59)
HGB BLD-MCNC: 14.4 G/DL (ref 13–17.5)
IMM GRANULOCYTES # BLD AUTO: 0.03 X10(3) UL (ref 0–1)
IMM GRANULOCYTES NFR BLD: 0.3 %
LDLC SERPL CALC-MCNC: 128 MG/DL (ref ?–100)
LYMPHOCYTES # BLD AUTO: 1.51 X10(3) UL (ref 1–4)
LYMPHOCYTES NFR BLD AUTO: 16 %
MCH RBC QN AUTO: 31.8 PG (ref 26–34)
MCHC RBC AUTO-ENTMCNC: 34.1 G/DL (ref 31–37)
MCV RBC AUTO: 93.2 FL (ref 80–100)
MONOCYTES # BLD AUTO: 0.83 X10(3) UL (ref 0.1–1)
MONOCYTES NFR BLD AUTO: 8.8 %
NEUTROPHILS # BLD AUTO: 6.96 X10 (3) UL (ref 1.5–7.7)
NEUTROPHILS # BLD AUTO: 6.96 X10(3) UL (ref 1.5–7.7)
NEUTROPHILS NFR BLD AUTO: 73.9 %
NONHDLC SERPL-MCNC: 164 MG/DL (ref ?–130)
OSMOLALITY SERPL CALC.SUM OF ELEC: 289 MOSM/KG (ref 275–295)
PLATELET # BLD AUTO: 243 10(3)UL (ref 150–450)
POTASSIUM SERPL-SCNC: 4.1 MMOL/L (ref 3.5–5.1)
PROT SERPL-MCNC: 7.5 G/DL (ref 5.7–8.2)
RBC # BLD AUTO: 4.53 X10(6)UL (ref 4.3–5.7)
SODIUM SERPL-SCNC: 140 MMOL/L (ref 136–145)
TRIGL SERPL-MCNC: 202 MG/DL (ref 30–149)
TSI SER-ACNC: 0.59 UIU/ML (ref 0.55–4.78)
VIT D+METAB SERPL-MCNC: 35.3 NG/ML (ref 30–100)
VLDLC SERPL CALC-MCNC: 37 MG/DL (ref 0–30)
WBC # BLD AUTO: 9.4 X10(3) UL (ref 4–11)

## 2025-06-10 PROCEDURE — 80061 LIPID PANEL: CPT

## 2025-06-10 PROCEDURE — 36415 COLL VENOUS BLD VENIPUNCTURE: CPT

## 2025-06-10 PROCEDURE — 84402 ASSAY OF FREE TESTOSTERONE: CPT

## 2025-06-10 PROCEDURE — 84443 ASSAY THYROID STIM HORMONE: CPT

## 2025-06-10 PROCEDURE — 84403 ASSAY OF TOTAL TESTOSTERONE: CPT

## 2025-06-10 PROCEDURE — 82306 VITAMIN D 25 HYDROXY: CPT

## 2025-06-10 PROCEDURE — 80053 COMPREHEN METABOLIC PANEL: CPT

## 2025-06-10 PROCEDURE — 85025 COMPLETE CBC W/AUTO DIFF WBC: CPT

## 2025-06-13 LAB
FREE TESTOST DIRECT: 3.6 PG/ML
TESTOSTERONE: 382 NG/DL

## 2025-08-28 ENCOUNTER — HOSPITAL ENCOUNTER (OUTPATIENT)
Age: 50
Discharge: HOME | End: 2025-08-28

## 2025-08-28 VITALS
SYSTOLIC BLOOD PRESSURE: 136 MMHG | TEMPERATURE: 98 F | WEIGHT: 195 LBS | BODY MASS INDEX: 27 KG/M2 | RESPIRATION RATE: 18 BRPM | HEART RATE: 68 BPM | OXYGEN SATURATION: 99 % | DIASTOLIC BLOOD PRESSURE: 82 MMHG

## 2025-08-28 DIAGNOSIS — R04.0 EPISTAXIS: Primary | ICD-10-CM

## 2025-08-28 PROCEDURE — 99212 OFFICE O/P EST SF 10 MIN: CPT

## 2025-08-28 PROCEDURE — 99213 OFFICE O/P EST LOW 20 MIN: CPT

## 2025-08-28 RX ORDER — OXYMETAZOLINE HYDROCHLORIDE 0.05 G/100ML
2 SPRAY NASAL 2 TIMES DAILY
Qty: 1 EACH | Refills: 0 | Status: SHIPPED | OUTPATIENT
Start: 2025-08-28 | End: 2025-08-31